# Patient Record
Sex: MALE | Race: OTHER | HISPANIC OR LATINO | URBAN - METROPOLITAN AREA
[De-identification: names, ages, dates, MRNs, and addresses within clinical notes are randomized per-mention and may not be internally consistent; named-entity substitution may affect disease eponyms.]

---

## 2021-07-29 ENCOUNTER — EMERGENCY (EMERGENCY)
Facility: HOSPITAL | Age: 31
LOS: 1 days | Discharge: HOME | End: 2021-07-29
Attending: STUDENT IN AN ORGANIZED HEALTH CARE EDUCATION/TRAINING PROGRAM | Admitting: STUDENT IN AN ORGANIZED HEALTH CARE EDUCATION/TRAINING PROGRAM
Payer: SELF-PAY

## 2021-07-29 VITALS
SYSTOLIC BLOOD PRESSURE: 133 MMHG | HEIGHT: 69 IN | RESPIRATION RATE: 16 BRPM | DIASTOLIC BLOOD PRESSURE: 87 MMHG | OXYGEN SATURATION: 98 % | TEMPERATURE: 98 F | WEIGHT: 210.1 LBS | HEART RATE: 79 BPM

## 2021-07-29 VITALS
RESPIRATION RATE: 17 BRPM | SYSTOLIC BLOOD PRESSURE: 119 MMHG | TEMPERATURE: 98 F | OXYGEN SATURATION: 98 % | DIASTOLIC BLOOD PRESSURE: 80 MMHG | HEART RATE: 80 BPM

## 2021-07-29 LAB
ALBUMIN SERPL ELPH-MCNC: 3.8 G/DL — SIGNIFICANT CHANGE UP (ref 3.3–5)
ALP SERPL-CCNC: 74 U/L — SIGNIFICANT CHANGE UP (ref 40–120)
ALT FLD-CCNC: 74 U/L — SIGNIFICANT CHANGE UP (ref 12–78)
ANION GAP SERPL CALC-SCNC: 9 MMOL/L — SIGNIFICANT CHANGE UP (ref 5–17)
AST SERPL-CCNC: 84 U/L — HIGH (ref 15–37)
BASOPHILS # BLD AUTO: 0.05 K/UL — SIGNIFICANT CHANGE UP (ref 0–0.2)
BASOPHILS NFR BLD AUTO: 1 % — SIGNIFICANT CHANGE UP (ref 0–2)
BILIRUB SERPL-MCNC: 0.6 MG/DL — SIGNIFICANT CHANGE UP (ref 0.2–1.2)
BUN SERPL-MCNC: 15 MG/DL — SIGNIFICANT CHANGE UP (ref 7–23)
CALCIUM SERPL-MCNC: 8.8 MG/DL — SIGNIFICANT CHANGE UP (ref 8.5–10.1)
CHLORIDE SERPL-SCNC: 104 MMOL/L — SIGNIFICANT CHANGE UP (ref 96–108)
CO2 SERPL-SCNC: 27 MMOL/L — SIGNIFICANT CHANGE UP (ref 22–31)
CREAT SERPL-MCNC: 1 MG/DL — SIGNIFICANT CHANGE UP (ref 0.5–1.3)
EOSINOPHIL # BLD AUTO: 0.02 K/UL — SIGNIFICANT CHANGE UP (ref 0–0.5)
EOSINOPHIL NFR BLD AUTO: 0.4 % — SIGNIFICANT CHANGE UP (ref 0–6)
GLUCOSE SERPL-MCNC: 120 MG/DL — HIGH (ref 70–99)
HCT VFR BLD CALC: 42.3 % — SIGNIFICANT CHANGE UP (ref 39–50)
HGB BLD-MCNC: 14.8 G/DL — SIGNIFICANT CHANGE UP (ref 13–17)
IMM GRANULOCYTES NFR BLD AUTO: 0.2 % — SIGNIFICANT CHANGE UP (ref 0–1.5)
LIDOCAIN IGE QN: 50 U/L — LOW (ref 73–393)
LYMPHOCYTES # BLD AUTO: 2.32 K/UL — SIGNIFICANT CHANGE UP (ref 1–3.3)
LYMPHOCYTES # BLD AUTO: 46.3 % — HIGH (ref 13–44)
MCHC RBC-ENTMCNC: 29.1 PG — SIGNIFICANT CHANGE UP (ref 27–34)
MCHC RBC-ENTMCNC: 35 GM/DL — SIGNIFICANT CHANGE UP (ref 32–36)
MCV RBC AUTO: 83.3 FL — SIGNIFICANT CHANGE UP (ref 80–100)
MONOCYTES # BLD AUTO: 0.33 K/UL — SIGNIFICANT CHANGE UP (ref 0–0.9)
MONOCYTES NFR BLD AUTO: 6.6 % — SIGNIFICANT CHANGE UP (ref 2–14)
NEUTROPHILS # BLD AUTO: 2.28 K/UL — SIGNIFICANT CHANGE UP (ref 1.8–7.4)
NEUTROPHILS NFR BLD AUTO: 45.5 % — SIGNIFICANT CHANGE UP (ref 43–77)
NRBC # BLD: 0 /100 WBCS — SIGNIFICANT CHANGE UP (ref 0–0)
PLATELET # BLD AUTO: 249 K/UL — SIGNIFICANT CHANGE UP (ref 150–400)
POTASSIUM SERPL-MCNC: 3.6 MMOL/L — SIGNIFICANT CHANGE UP (ref 3.5–5.3)
POTASSIUM SERPL-SCNC: 3.6 MMOL/L — SIGNIFICANT CHANGE UP (ref 3.5–5.3)
PROT SERPL-MCNC: 7.5 G/DL — SIGNIFICANT CHANGE UP (ref 6–8.3)
RBC # BLD: 5.08 M/UL — SIGNIFICANT CHANGE UP (ref 4.2–5.8)
RBC # FLD: 13.7 % — SIGNIFICANT CHANGE UP (ref 10.3–14.5)
SODIUM SERPL-SCNC: 140 MMOL/L — SIGNIFICANT CHANGE UP (ref 135–145)
WBC # BLD: 5.01 K/UL — SIGNIFICANT CHANGE UP (ref 3.8–10.5)
WBC # FLD AUTO: 5.01 K/UL — SIGNIFICANT CHANGE UP (ref 3.8–10.5)

## 2021-07-29 PROCEDURE — 99284 EMERGENCY DEPT VISIT MOD MDM: CPT | Mod: 25

## 2021-07-29 PROCEDURE — 96374 THER/PROPH/DIAG INJ IV PUSH: CPT

## 2021-07-29 PROCEDURE — 83690 ASSAY OF LIPASE: CPT

## 2021-07-29 PROCEDURE — 80053 COMPREHEN METABOLIC PANEL: CPT

## 2021-07-29 PROCEDURE — 96375 TX/PRO/DX INJ NEW DRUG ADDON: CPT

## 2021-07-29 PROCEDURE — 99284 EMERGENCY DEPT VISIT MOD MDM: CPT

## 2021-07-29 PROCEDURE — 36415 COLL VENOUS BLD VENIPUNCTURE: CPT

## 2021-07-29 PROCEDURE — 85025 COMPLETE CBC W/AUTO DIFF WBC: CPT

## 2021-07-29 RX ORDER — ONDANSETRON 8 MG/1
4 TABLET, FILM COATED ORAL ONCE
Refills: 0 | Status: COMPLETED | OUTPATIENT
Start: 2021-07-29 | End: 2021-07-29

## 2021-07-29 RX ORDER — FAMOTIDINE 10 MG/ML
1 INJECTION INTRAVENOUS
Qty: 60 | Refills: 0
Start: 2021-07-29 | End: 2021-08-27

## 2021-07-29 RX ORDER — FAMOTIDINE 10 MG/ML
20 INJECTION INTRAVENOUS ONCE
Refills: 0 | Status: COMPLETED | OUTPATIENT
Start: 2021-07-29 | End: 2021-07-29

## 2021-07-29 RX ORDER — SODIUM CHLORIDE 9 MG/ML
1000 INJECTION INTRAMUSCULAR; INTRAVENOUS; SUBCUTANEOUS ONCE
Refills: 0 | Status: COMPLETED | OUTPATIENT
Start: 2021-07-29 | End: 2021-07-29

## 2021-07-29 RX ADMIN — SODIUM CHLORIDE 1000 MILLILITER(S): 9 INJECTION INTRAMUSCULAR; INTRAVENOUS; SUBCUTANEOUS at 12:42

## 2021-07-29 RX ADMIN — Medication 30 MILLILITER(S): at 12:43

## 2021-07-29 RX ADMIN — FAMOTIDINE 20 MILLIGRAM(S): 10 INJECTION INTRAVENOUS at 12:43

## 2021-07-29 RX ADMIN — ONDANSETRON 4 MILLIGRAM(S): 8 TABLET, FILM COATED ORAL at 12:43

## 2021-07-29 NOTE — ED PROVIDER NOTE - NSFOLLOWUPINSTRUCTIONS_ED_ALL_ED_FT
Dolor de pecho    LO QUE NECESITA SABER:    El dolor en el pecho puede ser provocado por blair variedad de condiciones, algunas no tan serias y otras que son de peligro mortal. El dolor de pecho también puede ser un síntoma de un problema digestivo, mariola la acidez o blair úlcera estomacal. Un ataque de ansiedad o blair emoción malorie, mariola el enojo, también pueden provocar dolor de pecho. Blair infección, inflamación o fractura en un hueso o cartílago en el pecho podría provocar dolor o molestia. En ocasiones el dolor torácico o la presión en el pecho pueden ser el resultado de michelle circulación de la magy al corazón (angina). El dolor de pecho también puede ser causado por trastornos potencialmente mortales mariola un ataque al corazón o un coágulo de magy en los pulmones.    [facility]CCIONES SOBRE [facility]:    Llame al número local de emergencias (911 en los [place]) o pídale a alguien que llame si:  •Tiene alguno de los siguientes signos de un ataque cardíaco: ?Estrujamiento, presión o tensión en huerta pecho      ?Usted también podría presentar alguno de los siguientes: ?Malestar o dolor en huerta espalda, vick, mandíbula, abdomen, o brazo      ?Falta de aliento      ?Náuseas o vómitos      ?Desvanecimiento o sudor frío repentino            Regrese a la hadley de emergencias si:  •La inflamación en heurta pecho empeora, aun con tratamiento.      •Usted tose o vomita magy.      •Pallavi heces son negras o tienen magy.      •Usted no puede dejar de vomitar o le duele al tragar.      Llame a huerta médico si:  •Usted tiene preguntas o inquietudes acerca de huerta condición o cuidado.          Medicamentos:  •Los medicamentospueden administrarse para tratar la causa del dolor de pecho. Por ejemplo, analgésicos, medicamentos para la ansiedad o medicamentos para aumentar el flujo de magy al corazón.      •No tome ciertos medicamentos sin antes preguntarle a huerta médico.Estos incluyen GABBI, suplementos vitamínicos o a base de hierbas u hormonas (estrógeno o progestágeno).      •Bucyrus pallavi medicamentos mariola se le haya indicado.Consulte con huerta médico si usted marah que huerta medicamento no le está ayudando o si presenta efectos secundarios. Infórmele si es alérgico a cualquier medicamento. Mantenga blair lista actualizada de los medicamentos, las vitaminas y los productos herbales que lencho. Incluya los siguientes datos de los medicamentos: cantidad, frecuencia y motivo de administración. Traiga con usted la lista o los envases de las píldoras a pallavi citas de seguimiento. Lleve la lista de los medicamentos con usted en alicia de blair emergencia.      Consejos para vivir saludable:Los siguientes son consejos generales de ana. Si se conoce la causa de huerta dolor de pecho, huerta médico le dará pautas específicas a seguir.  •No fume.La nicotina y otros químicos contenidos en los cigarrillos y cigarros pueden causar daño a pallavi pulmones y el corazón. Pida información a huerta médico si usted actualmente fuma y necesita ayuda para dejar de fumar. Los cigarrillos electrónicos o el tabaco sin humo igualmente contienen nicotina. Consulte con huerta médico antes de utilizar estos productos.      •Elija blair variedad de alimentos saludables tan a menudo mariola sea posible.Incluya frutas y verduras frescas, congeladas o enlatadas. También incluya productos lácteos bajos en grasa, pescado, mary jo (sin piel) y serena magras. Huerta médico o dietista pueden ayudarlo a crear planes de alimentos. Es posible que tenga que evitar ciertos alimentos o bebidas si el dolor es causado por un problema de digestión.  Alimentos saludables           •Reduzca el consumo de sodio (sal).Limite el consumo de alimentos altos en sodio, mariola comidas enlatadas, bocadillos salados y embutidos. Si añade servando cuando cocina la comida, no añada más en la medrano. Elija alimentos enlatados bajos en sodio tanto mariola sea posible.             •Consuma abundante agua al día.El agua ayuda al cuerpo a controlar la temperatura y la presión arterial. Pregunte a huerta médico cuál es la cantidad de agua que debería consumir cada día.      •Pregunte acerca de la actividad.Huerta médico le dirá cuáles actividades limitar y cuáles evitar. Pregunte cuándo puede manejar, regresar a huerta trabajo y tener relaciones sexuales. Pida más información acerca de un plan de ejercicio adecuado para usted.      •Mantenga un peso saludable.Pregúntele a huerta médico cuál es el peso ideal para usted. Pídale que lo ayude a crear un plan seguro para bajar de peso si tiene sobrepeso.      •Pregunte sobre las vacunas que pudiera necesitar.Vacúnese contra la influenza (gripe) todos los años tan pronto mariola se recomiende, normalmente en septiembre u octubre. Usted también podría necesitar la vacuna antineumocócica para evitar la neumonía. La vacuna se administra generalmente cada 5 años, a partir de los 65 años. Huerta médico puede indicarle si debe recibir otras vacunas, y cuándo aplicárselas.      Programe blair payton con huerta médico dentro de 72 horas o mariola se le indique:Es posible que deba regresar para hacerse más pruebas para encontrar la causa del dolor de pecho. Es probable que lo refieran a un especialista, mariola un cardiólogo o un gastroenterólogo. Anote pallavi preguntas para que se acuerde de hacerlas aaliyah pallavi visitas.

## 2021-07-29 NOTE — ED ADULT NURSE NOTE - OBJECTIVE STATEMENT
Pt received in bed alert and oriented with generalized abd pain. Pt states that pain started few days ago. Pt denies diarrhea or vomiting but c/o nausea. As per Md's orders IV catherine placed blood specimen obtained and sent to the lab. Meds given and tolerated well. Pt now prepped for ct scan. Nursing care ongoing and safety maintained.

## 2021-07-29 NOTE — ED PROVIDER NOTE - PATIENT PORTAL LINK FT
You can access the FollowMyHealth Patient Portal offered by [facility] by registering at the following website: [email or web address]. By joining [employer]’s FollowMyHealth portal, you will also be able to view your health information using other applications (apps) compatible with our system.

## 2021-07-29 NOTE — ED PROVIDER NOTE - NSFOLLOWUPCLINICS_GEN_ALL_ED_FT
[facility]  Family Medicine  [address] [place] [address]  Phone: [phone]  Fax:   Follow Up Time: 4-6 Days

## 2021-07-29 NOTE — ED PROVIDER NOTE - ATTENDING CONTRIBUTION TO CARE
31 M with epigastric pain, reflux and vomiting. On exam patient comfortable, lungs clear to auscultation, heart regular rate and rhythm. minimal epigastric tenderness to palpation. Eckert negative. r/o pancreatitis, may be gerd / gastritis.

## 2021-07-29 NOTE — ED PROVIDER NOTE - OBJECTIVE STATEMENT
31 year old M with no significant PMH presents to the ED today with 3 days of upper abdominal discomfort and esophageal reflux. Patient endorses unspecified discomfort that began spontaneously and is associated with loss of appetite, fatigue, nausea and vomiting yellow liquid x1 yesterday. Patient drank gasper seltzer and sprite with no improvement of symptoms. Denies cough, h/a, sore throat, chest pain, dizziness, changes in bowel movements/urinary patterns. 31 year old M with no significant PMH presents to the ED today with 3 days of upper abdominal discomfort and esophageal reflux. Patient endorses unspecified upper abdominal and esophageal discomfort that began spontaneously on sunday and is associated with loss of appetite, fatigue, nausea and vomiting yellow liquid x1 yesterday. Patient drank gasper seltzer and sprite with no improvement of symptoms. Denies cough, h/a, sore throat, chest pain, dizziness, changes in bowel movements/urinary patterns. 31 year old M with no significant PMH presents to the ED today with 3 days of upper abdominal discomfort and feeling of reflux. Patient endorses unspecified upper abdominal and esophageal discomfort that began spontaneously on sunday and is associated with loss of appetite, fatigue, nausea and vomiting yellow liquid x1 yesterday. Patient drank gasper seltzer and sprite with no improvement of symptoms. Denies cough, h/a, sore throat, chest pain, dizziness, changes in bowel movements/urinary patterns.

## 2021-07-29 NOTE — ED ADULT TRIAGE NOTE - CHIEF COMPLAINT QUOTE
"I have a very bad pain in my stomach, I have been feeling bad for 3 days & I can't eat, I feel like I want to throw up but I don't & I feel exhausted"

## 2021-07-29 NOTE — ED PROVIDER NOTE - CONSTITUTIONAL, MLM
Well appearing, awake, alert, oriented to person, place, time/situation and in no apparent distress normal...

## 2024-01-30 NOTE — ED ADULT NURSE NOTE - NS ED NOTE ABUSE SUSPICION NEGLECT YN
You can access the FollowMyHealth Patient Portal offered by [facility] by registering at the following website: [email or web address]. By joining [employer]’s FollowMyHealth portal, you will also be able to view your health information using other applications (apps) compatible with our system. No